# Patient Record
Sex: MALE | Race: WHITE
[De-identification: names, ages, dates, MRNs, and addresses within clinical notes are randomized per-mention and may not be internally consistent; named-entity substitution may affect disease eponyms.]

---

## 2019-10-22 ENCOUNTER — HOSPITAL ENCOUNTER (INPATIENT)
Dept: HOSPITAL 15 - ER | Age: 49
LOS: 1 days | Discharge: HOME | DRG: 144 | End: 2019-10-23
Attending: INTERNAL MEDICINE | Admitting: NURSE PRACTITIONER
Payer: COMMERCIAL

## 2019-10-22 VITALS — WEIGHT: 225 LBS | HEIGHT: 72 IN | BODY MASS INDEX: 30.48 KG/M2

## 2019-10-22 DIAGNOSIS — R06.03: Primary | ICD-10-CM

## 2019-10-22 DIAGNOSIS — I25.2: ICD-10-CM

## 2019-10-22 DIAGNOSIS — R73.03: ICD-10-CM

## 2019-10-22 DIAGNOSIS — R79.89: ICD-10-CM

## 2019-10-22 DIAGNOSIS — Z82.0: ICD-10-CM

## 2019-10-22 DIAGNOSIS — F41.9: ICD-10-CM

## 2019-10-22 DIAGNOSIS — I25.10: ICD-10-CM

## 2019-10-22 DIAGNOSIS — Z95.5: ICD-10-CM

## 2019-10-22 DIAGNOSIS — Z79.899: ICD-10-CM

## 2019-10-22 DIAGNOSIS — Z88.6: ICD-10-CM

## 2019-10-22 DIAGNOSIS — Z81.1: ICD-10-CM

## 2019-10-22 DIAGNOSIS — E78.5: ICD-10-CM

## 2019-10-22 DIAGNOSIS — I10: ICD-10-CM

## 2019-10-22 LAB
ALBUMIN SERPL-MCNC: 3.7 G/DL (ref 3.4–5)
ALP SERPL-CCNC: 81 U/L (ref 45–117)
ALT SERPL-CCNC: 54 U/L (ref 16–61)
ANION GAP SERPL CALCULATED.3IONS-SCNC: 7 MMOL/L (ref 5–15)
APTT PPP: 26.8 SEC (ref 23.64–32.05)
BILIRUB SERPL-MCNC: 0.5 MG/DL (ref 0.2–1)
BUN SERPL-MCNC: 16 MG/DL (ref 7–18)
BUN/CREAT SERPL: 17.8
CALCIUM SERPL-MCNC: 8.9 MG/DL (ref 8.5–10.1)
CHLORIDE SERPL-SCNC: 105 MMOL/L (ref 98–107)
CO2 SERPL-SCNC: 26 MMOL/L (ref 21–32)
GLUCOSE SERPL-MCNC: 137 MG/DL (ref 74–106)
HCT VFR BLD AUTO: 42.2 % (ref 41–53)
HGB BLD-MCNC: 14.2 G/DL (ref 13.5–17.5)
INR PPP: 0.94 (ref 0.9–1.15)
MAGNESIUM SERPL-MCNC: 2.4 MG/DL (ref 1.6–2.6)
MCH RBC QN AUTO: 31.9 PG (ref 28–32)
MCV RBC AUTO: 94.7 FL (ref 80–100)
NRBC BLD QL AUTO: 0.1 %
POTASSIUM SERPL-SCNC: 3.6 MMOL/L (ref 3.5–5.1)
PROT SERPL-MCNC: 7.9 G/DL (ref 6.4–8.2)
SODIUM SERPL-SCNC: 138 MMOL/L (ref 136–145)

## 2019-10-22 PROCEDURE — 71046 X-RAY EXAM CHEST 2 VIEWS: CPT

## 2019-10-22 PROCEDURE — 80061 LIPID PANEL: CPT

## 2019-10-22 PROCEDURE — 83735 ASSAY OF MAGNESIUM: CPT

## 2019-10-22 PROCEDURE — 80053 COMPREHEN METABOLIC PANEL: CPT

## 2019-10-22 PROCEDURE — 85730 THROMBOPLASTIN TIME PARTIAL: CPT

## 2019-10-22 PROCEDURE — 93306 TTE W/DOPPLER COMPLETE: CPT

## 2019-10-22 PROCEDURE — 83036 HEMOGLOBIN GLYCOSYLATED A1C: CPT

## 2019-10-22 PROCEDURE — 83880 ASSAY OF NATRIURETIC PEPTIDE: CPT

## 2019-10-22 PROCEDURE — 84484 ASSAY OF TROPONIN QUANT: CPT

## 2019-10-22 PROCEDURE — 85610 PROTHROMBIN TIME: CPT

## 2019-10-22 PROCEDURE — 85025 COMPLETE CBC W/AUTO DIFF WBC: CPT

## 2019-10-22 PROCEDURE — 36415 COLL VENOUS BLD VENIPUNCTURE: CPT

## 2019-10-22 PROCEDURE — 81001 URINALYSIS AUTO W/SCOPE: CPT

## 2019-10-22 PROCEDURE — 85379 FIBRIN DEGRADATION QUANT: CPT

## 2019-10-23 VITALS — DIASTOLIC BLOOD PRESSURE: 68 MMHG | SYSTOLIC BLOOD PRESSURE: 129 MMHG

## 2019-10-23 VITALS — DIASTOLIC BLOOD PRESSURE: 85 MMHG | SYSTOLIC BLOOD PRESSURE: 149 MMHG

## 2019-10-23 VITALS — SYSTOLIC BLOOD PRESSURE: 134 MMHG | DIASTOLIC BLOOD PRESSURE: 83 MMHG

## 2019-10-23 VITALS — SYSTOLIC BLOOD PRESSURE: 124 MMHG | DIASTOLIC BLOOD PRESSURE: 64 MMHG

## 2019-10-23 LAB
CHOLEST SERPL-MCNC: 134 MG/DL (ref ?–200)
HDLC SERPL-MCNC: 34 MG/DL (ref 40–59)
TRIGL SERPL-MCNC: 126 MG/DL (ref ?–150)

## 2019-10-23 NOTE — NUR
Telemetry admit from ER

BLANCHE ROBERTSON admitted to Telemetry unit.  Patient oriented to MANN DURHAM, RN primary 
RN, to 238a and unit policies regarding patient care and visiting hours. Patient now on 
continuous telemetry monitoring, tele box # 4 and telemetry reading on arrival to unit is 
normal sinus reading. Patient weighed by bedscale and encouraged to call. Patient updated on 
plan of care and verbalized understanding. Will continue to monitor.

## 2019-10-23 NOTE — NUR
ROUNDS

Dr Simpson at bedside for rounds, new orders received and followed through. Patient updated 
on plan of care, verbalized understanding.

## 2019-10-23 NOTE — NUR
IV insertion

IV access obtained, via clean sterile technique by inserting 20 gauge catheter on Right hand 
after 1 attempt. IV secured properly. No trauma to site. Patient tolerated well.

## 2022-09-07 ENCOUNTER — HOSPITAL ENCOUNTER (INPATIENT)
Dept: HOSPITAL 15 - ER | Age: 52
LOS: 1 days | Discharge: LEFT BEFORE DISCHARGE | DRG: 861 | End: 2022-09-08
Attending: FAMILY MEDICINE | Admitting: NURSE PRACTITIONER
Payer: MEDICAID

## 2022-09-07 VITALS — WEIGHT: 202.83 LBS | HEIGHT: 72 IN | BODY MASS INDEX: 27.47 KG/M2

## 2022-09-07 DIAGNOSIS — D64.9: ICD-10-CM

## 2022-09-07 DIAGNOSIS — I10: ICD-10-CM

## 2022-09-07 DIAGNOSIS — Z20.822: ICD-10-CM

## 2022-09-07 DIAGNOSIS — I25.10: ICD-10-CM

## 2022-09-07 DIAGNOSIS — Z95.5: ICD-10-CM

## 2022-09-07 DIAGNOSIS — Z53.29: ICD-10-CM

## 2022-09-07 DIAGNOSIS — E86.0: ICD-10-CM

## 2022-09-07 DIAGNOSIS — E11.40: ICD-10-CM

## 2022-09-07 DIAGNOSIS — E78.00: ICD-10-CM

## 2022-09-07 DIAGNOSIS — Z82.49: ICD-10-CM

## 2022-09-07 DIAGNOSIS — E78.5: ICD-10-CM

## 2022-09-07 DIAGNOSIS — Z88.8: ICD-10-CM

## 2022-09-07 DIAGNOSIS — I25.2: ICD-10-CM

## 2022-09-07 DIAGNOSIS — F32.A: ICD-10-CM

## 2022-09-07 DIAGNOSIS — C85.90: ICD-10-CM

## 2022-09-07 DIAGNOSIS — E11.21: ICD-10-CM

## 2022-09-07 DIAGNOSIS — R53.1: Primary | ICD-10-CM

## 2022-09-07 DIAGNOSIS — Z94.84: ICD-10-CM

## 2022-09-07 DIAGNOSIS — G20: ICD-10-CM

## 2022-09-07 LAB
ALBUMIN SERPL-MCNC: 3.7 G/DL (ref 3.4–5)
ALP SERPL-CCNC: 73 U/L (ref 45–117)
ALT SERPL-CCNC: 43 U/L (ref 16–61)
ANION GAP SERPL CALCULATED.3IONS-SCNC: 6 MMOL/L (ref 5–15)
APTT PPP: 25.9 SEC (ref 24.6–33.4)
BILIRUB SERPL-MCNC: 0.2 MG/DL (ref 0.2–1)
BUN SERPL-MCNC: 7 MG/DL (ref 7–18)
BUN/CREAT SERPL: 7.9
CALCIUM SERPL-MCNC: 8.9 MG/DL (ref 8.5–10.1)
CHLORIDE SERPL-SCNC: 107 MMOL/L (ref 98–107)
CO2 SERPL-SCNC: 25 MMOL/L (ref 21–32)
GLUCOSE SERPL-MCNC: 114 MG/DL (ref 74–106)
HCT VFR BLD AUTO: 42.1 % (ref 41–53)
HGB BLD-MCNC: 13.7 G/DL (ref 13.5–17.5)
INR PPP: 0.93 (ref 0.9–1.15)
MCH RBC QN AUTO: 30.1 PG (ref 28–32)
MCV RBC AUTO: 92.3 FL (ref 80–100)
NRBC BLD QL AUTO: 0.1 %
POTASSIUM SERPL-SCNC: 3.9 MMOL/L (ref 3.5–5.1)
PROT SERPL-MCNC: 7.4 G/DL (ref 6.4–8.2)
SODIUM SERPL-SCNC: 138 MMOL/L (ref 136–145)

## 2022-09-07 PROCEDURE — 85025 COMPLETE CBC W/AUTO DIFF WBC: CPT

## 2022-09-07 PROCEDURE — 82550 ASSAY OF CK (CPK): CPT

## 2022-09-07 PROCEDURE — 70551 MRI BRAIN STEM W/O DYE: CPT

## 2022-09-07 PROCEDURE — 80307 DRUG TEST PRSMV CHEM ANLYZR: CPT

## 2022-09-07 PROCEDURE — 80053 COMPREHEN METABOLIC PANEL: CPT

## 2022-09-07 PROCEDURE — 81001 URINALYSIS AUTO W/SCOPE: CPT

## 2022-09-07 PROCEDURE — 36415 COLL VENOUS BLD VENIPUNCTURE: CPT

## 2022-09-07 PROCEDURE — 85730 THROMBOPLASTIN TIME PARTIAL: CPT

## 2022-09-07 PROCEDURE — 71045 X-RAY EXAM CHEST 1 VIEW: CPT

## 2022-09-07 PROCEDURE — 70496 CT ANGIOGRAPHY HEAD: CPT

## 2022-09-07 PROCEDURE — 93005 ELECTROCARDIOGRAM TRACING: CPT

## 2022-09-07 PROCEDURE — 85610 PROTHROMBIN TIME: CPT

## 2022-09-07 PROCEDURE — 84484 ASSAY OF TROPONIN QUANT: CPT

## 2022-09-07 PROCEDURE — 82962 GLUCOSE BLOOD TEST: CPT

## 2022-09-08 VITALS — SYSTOLIC BLOOD PRESSURE: 132 MMHG | DIASTOLIC BLOOD PRESSURE: 82 MMHG

## 2022-09-08 LAB
ALCOHOL, URINE: < 3 MG/DL (ref 0–10)
AMPHETAMINES UR QL SCN: NEGATIVE
BARBITURATES UR QL SCN: NEGATIVE
BENZODIAZ UR QL SCN: NEGATIVE
BZE UR QL SCN: NEGATIVE
CANNABINOIDS UR QL SCN: NEGATIVE
OPIATES UR QL SCN: NEGATIVE
PCP UR QL SCN: NEGATIVE

## 2022-09-08 RX ADMIN — ENOXAPARIN SODIUM SCH MG: 40 INJECTION SUBCUTANEOUS at 09:28

## 2022-09-08 RX ADMIN — Medication SCH STRIP: at 06:49

## 2022-09-08 RX ADMIN — HUMAN INSULIN SCH UNITS: 100 INJECTION, SOLUTION SUBCUTANEOUS at 11:30

## 2022-09-08 RX ADMIN — ENOXAPARIN SODIUM SCH MG: 40 INJECTION SUBCUTANEOUS at 09:19

## 2022-09-08 RX ADMIN — HUMAN INSULIN SCH UNITS: 100 INJECTION, SOLUTION SUBCUTANEOUS at 06:49

## 2022-09-08 RX ADMIN — Medication SCH STRIP: at 11:30

## 2024-12-31 ENCOUNTER — HOSPITAL ENCOUNTER (OUTPATIENT)
Dept: HOSPITAL 15 - XYW | Age: 54
Discharge: HOME | End: 2024-12-31
Attending: INTERNAL MEDICINE
Payer: MEDICAID

## 2024-12-31 VITALS — HEIGHT: 72 IN | BODY MASS INDEX: 27.77 KG/M2 | WEIGHT: 205 LBS

## 2024-12-31 DIAGNOSIS — Z01.810: Primary | ICD-10-CM

## 2024-12-31 DIAGNOSIS — I11.0: ICD-10-CM

## 2024-12-31 DIAGNOSIS — I50.9: ICD-10-CM

## 2024-12-31 DIAGNOSIS — E11.9: ICD-10-CM

## 2024-12-31 DIAGNOSIS — R07.89: ICD-10-CM

## 2024-12-31 DIAGNOSIS — R00.2: ICD-10-CM

## 2024-12-31 DIAGNOSIS — Z88.8: ICD-10-CM

## 2024-12-31 DIAGNOSIS — I25.118: ICD-10-CM

## 2024-12-31 DIAGNOSIS — I25.10: ICD-10-CM

## 2024-12-31 DIAGNOSIS — R06.02: ICD-10-CM

## 2024-12-31 DIAGNOSIS — F15.90: ICD-10-CM

## 2024-12-31 DIAGNOSIS — R42: ICD-10-CM

## 2024-12-31 PROCEDURE — 93017 CV STRESS TEST TRACING ONLY: CPT

## 2024-12-31 PROCEDURE — 78452 HT MUSCLE IMAGE SPECT MULT: CPT

## 2024-12-31 RX ADMIN — REGADENOSON ONE MG: 0.08 INJECTION, SOLUTION INTRAVENOUS at 11:36

## 2025-01-01 NOTE — DVHSR
--------------- APPROVED REPORT --------------





Exam: Nuclear Stress Test

Indication: PRE OP CLEARANCE

Stress Tech: TAI MURRIETA



Ht: 6 ft 0 in  Wt: 205 lbs BSA:  2.15 m2

BMI:  27.80



Medical History

Medical History: Diabetes, HTN, MI S/P 3 STENTS, HODGEKINS LYMPHOMA

Allergies: NIACIN



Stress Test Details

Stress Test:  Pharmacologic stress testing performed using 0.4 mg of regadenoson per 5 mL given IV ov
er 10 seconds.

  Reason for pharmacologic stress test: PRE OP CLEARANCE.

HR

Resting HR:            90 bpmMax Heart Rate (APMHR): 167.546443 bpm 

Max HR Achieved:  108 bpmTarget HR (85% APMHR): 141.404961 bpm

% of APMHR:         64.67

Recovery HR:            93 bpm



BP

Resting BP:  125/79 mmHg

Recovery BP:       121/71 mmHg

ECG

Resting ECG:  Sinus Rhythm



Clinical

Reason for Termination: Completed protocol



Nurse Comments

Received patient from Nuclear Medicine.  Patient is A&O x4 and on RA.  FOR VS please refer back to st
ress test assessment documentation.  Patient is connected to cardiac monitor.  See cardio-neuro proce
dural notes for addtional details.  PIV flushes well.  Reviewed POC and patient verbalizes understand
ing and consents to test.



Lexiscan stress test performed per protocol.  Nuclear Medicine tech administered the Cardiolite.  Pat
ient tolerated well and vitals returned to baseline.  Transferred to Nuclear Medicine via wheelchair 
with tech in stable condition.  



Stress ECG Conclusion

no severe stress induced ischemia noted



cont medical management for patient 



NM EXAM: Myocardial Perfusion REST/STRESS

Imaging Protocol: Rest Tc-99m/Stress Tc-99m 1 day



Resting Data

Rest SPECT myocardial perfusion imaging was performed in supine position 60 minutes following the int
ravenous injection of 14.7 mCi of Tc-99m Sestamibi.

Time of rest injection: 1025     

Time of rest imagin     

Administration Route: IV

Administration Site: Left Hand



Pharmacologic Stress

Pharmacologic stress test was performed by injecting Regadenoson 0.4 mg IV push followed by the intra
venous injection of 35.5 mCi of Tc-99m Sestamibi.

Time of stress injection: 1130     

Time of stress imagin     

Administration Route: IV

Administration Site: Left Hand

Gated Stress SPECT was performed 60 minutes after stress injection.

The images were gated to evaluate regional wall motion and calculate left ventricular ejection fracti
on. 

Stress only was performed in the Supine position.



Nuclear Conclusion

ECG Findings: negative for ischemia

Nuclear Findings: negative for ischemia

Left Ventricular Function: abnormal

no severe stress induced ischemia noted



cont medical management for patient